# Patient Record
(demographics unavailable — no encounter records)

---

## 2025-02-21 NOTE — ASSESSMENT
[FreeTextEntry1] : I had a lengthy discussion with the patient in regards to their treatment plan and diagnosis.  They do have objective weakness findings on my exam.  Their symptoms have persisted despite the conservative management they have attempted thus far.  As a result I would like to proceed with a lumbar MRI.  In tandem with this they should begin a physical therapy/home therapy program.  The patient can take Tizanidine and Tylenol as needed for pain control if medically able to.  I will have the patient follow-up in 2 to 3 weeks for repeat clinical evaluation.  I encouraged them to follow-up sooner if their symptoms worsen or change in any way.   The patient has tried the following treatments: Activity modification                + Ice/Compression                     + Braces                                     - NSAIDS                                  + Physical Therapy                    +  Please note the above modalities have been tried for 6+ weeks over the past 2 months

## 2025-02-21 NOTE — ADDENDUM
[FreeTextEntry1] :  I, Sushma Rodriguez, acted solely as a scribe for Dr. Aayush Dalton MD on this date 02/21/2025    All medical record entries made by the Scribe were at my, Dr. Aayush Dalton MD., direction and personally dictated by me on 02/21/2025 . I have reviewed the chart and agree that the record accurately reflects my personal performance of the history, physical exam, assessment and plan. I have also personally directed, reviewed, and agreed with the chart.

## 2025-02-21 NOTE — HISTORY OF PRESENT ILLNESS
[de-identified] : Patient is a 40 year old male who presents today for an initial evaluation of low back pain and right LE pain. He reports that he feels weaker in his right LE. He reported to the hospital for back pain. He states that he takes Aspirin for pain relief. He has issues of a CVA. Patient denies bowel/bladder incontinence. Denies saddle anesthesia.

## 2025-02-21 NOTE — PHYSICAL EXAM
[de-identified] :  Lumbar Physical Exam   Gait - antalgic   Station - Normal   Sagittal balance - Normal   Compensatory mechanism? - None   unable to toe/heel walk    Reflexes Patellar - normal Gastroc - normal Clonus - No   Hip Exam - Normal   Straight leg raise - none   Pulses - 2+ dp/pt   Range of motion - normal   Sensation Sensation intact to light touch in L1, L2, L3, L4, L5 and S1 dermatomes bilaterally   Motor IP Quad HS TA Gastroc EHL Right 3+/5 5/5 5/5 5/5 5/5 5/5 Left 5/5 5/5 5/5 5/5 5/5 5/5 [de-identified] : lumbar rad from the hospital  facet arthropathy disc heights relatively well maintained  transitional lumbosacral anatomy